# Patient Record
(demographics unavailable — no encounter records)

---

## 2024-11-06 NOTE — DATA REVIEWED
[de-identified] : Brain MRI was performed on 2/21/2024. There was no acute pathology. There was chronic ischemic change. [de-identified] : Blood test from February 2024 demonstrated B12 level of 226, and normal T4 level with slightly elevated TSH level.

## 2024-11-06 NOTE — CONSULT LETTER
[Dear  ___] : Dear  [unfilled], [Courtesy Letter:] : I had the pleasure of seeing your patient, [unfilled], in my office today. [Please see my note below.] : Please see my note below. [Consult Closing:] : Thank you very much for allowing me to participate in the care of this patient.  If you have any questions, please do not hesitate to contact me. [Sincerely,] : Sincerely, [FreeTextEntry3] : Demetris Andrews MD.

## 2024-11-06 NOTE — HISTORY OF PRESENT ILLNESS
[FreeTextEntry1] : I saw this patient in the office today. She presents with her son.  He reported that he has noticed that she has had cognitive decline for at least the past 6 to 8 months. She was hospitalized toward the middle of 2023 and it had been going on for a few months prior to that. Her overnight aides have noticed that occasionally at night she will get up and walk around the house.

## 2024-11-06 NOTE — ASSESSMENT
[FreeTextEntry1] : This is a 98-year-old woman with what appears to be moderate dementia likely representing Alzheimer's dementia. She had a slightly low B12 level. She is receiving supplementation for this. I do not see a need for any further diagnostic testing.  I explained that there are no medications that reverse cognitive decline in patients with dementia. There are medications that have been shown to slow the progression of functional decline. She is currently functioning at a relatively high level although she requires supervision. At present I do not see a need for any new medication. I have explained to her son should he notice any decline I would be happy to start medication. I would prefer memantine to donepezil as it tends to have fewer side effects.  I will see her back in 6 months.

## 2024-11-06 NOTE — PHYSICAL EXAM
[General Appearance - In No Acute Distress] : in no acute distress [General Appearance - Alert] : alert [Affect] : the affect was normal [Oriented to Person] : oriented to person [Oriented to Place] : oriented to place [Recall ___ / 3] : recall [unfilled] / 3 [Cranial Nerves Optic (II)] : visual acuity intact bilaterally,  visual fields full to confrontation, pupils equal round and reactive to light [Cranial Nerves Oculomotor (III)] : extraocular motion intact [Cranial Nerves Trigeminal (V)] : facial sensation intact symmetrically [Cranial Nerves Facial (VII)] : face symmetrical [Cranial Nerves Vestibulocochlear (VIII)] : hearing was intact bilaterally [Cranial Nerves Glossopharyngeal (IX)] : tongue and palate midline [Cranial Nerves Accessory (XI - Cranial And Spinal)] : head turning and shoulder shrug symmetric [Cranial Nerves Hypoglossal (XII)] : there was no tongue deviation with protrusion [Motor Tone] : muscle tone was normal in all four extremities [Motor Strength] : muscle strength was normal in all four extremities [Sensation Tactile Decrease] : light touch was intact [Sensation Pain / Temperature Decrease] : pain and temperature was intact [Sensation Vibration Decrease] : vibration was intact [Limited Balance] : the patient's balance was impaired [2+] : Patella left 2+ [Optic Disc Abnormality] : the optic disc were normal in size and color [Oriented to Time] : disoriented to time [Dysarthria] : no dysarthria [Aphasia] : no dysphasia/aphasia [Coordination - Dysmetria Impaired Finger-to-Nose Bilateral] : not present [Plantar Reflex Right Only] : normal on the right [Plantar Reflex Left Only] : normal on the left [FreeTextEntry8] : She presented in a wheelchair today.  She is able to ambulate with a walker at home.

## 2025-02-11 NOTE — ASSESSMENT
[FreeTextEntry1] : 98-year-old patient currently stable with progressive memory deficits recently having seen neurology with diagnosed with moderately advanced dementia feeling no medication is indicated.  Patient's son feels that she is status quo.  Hypertension controlled on present medications.  Mild asthma improved with Breo  Arrhythmia essentially controlled with occasional symptoms and patient instructed that if she has enough extra beats that persist she can take an extra half of Toprol if she needs to.  GERD controlled on chronic PPI Clinically euthyroid.  Previous blood work showed decreased vitamin B12 and vitamin D and recommended taking supplements which she is taking.  Status post fall May 2021 without any further falls. Patient's son feels she continues to be safe living alone with him watching over her closely.  Continue present medications High-dose influenza vaccine offered but declines Recieved the COVID vaccine Venipuncture done today in the office Followup in 6 months

## 2025-02-11 NOTE — HISTORY OF PRESENT ILLNESS
[FreeTextEntry1] : 98-year-old female presents with her son for followup with history of hypothyroidism for which she takes Synthroid, cardiac arrhythmia and hypertension on Toprol and chronic GERD on Nexium prn. Also history of von Willebrand's disease for which she periodically gets DDAVP injections  The patient has had increased issues with her memory and less functional physically.  Patient saw neurology May 2024 with diagnosis of moderately advanced dementia Alzheimer's type.  Medication was not felt indicated.  The patient is watched over by her son who states that he feels things are status quo.  Patient's ambulatory ability has decreased significantly where she generally is in a wheelchair.  Patient generally has been feeling very well without any complaints including chest pain, shortness of breath or edema.  The patient's continuing chronic issue is moderately severe bilateral knee degenerative joint disease for which she is following with orthopedic and gets injections periodically. She continues to follow with orthopedic with periodic injections  Also patient saw pulmonary in June 2020 with diagnoses of mild asthma and Breo has been started with benefit  Otherwise the patient is feeling well without chest pain, palpitations, shortness of breath or edema.  she is watched over closely by her son Cam who is her healthcare proxy. she no longer drives Unfortunately her main interaction was through her [employer] therefore decreased social interaction. She is content with being home and not going out and seeing other people. .She does have a medic alert

## 2025-02-11 NOTE — HISTORY OF PRESENT ILLNESS
[FreeTextEntry1] : 98-year-old female presents with her son for followup with history of hypothyroidism for which she takes Synthroid, cardiac arrhythmia and hypertension on Toprol and chronic GERD on Nexium prn. Also history of von Willebrand's disease for which she periodically gets DDAVP injections  The patient has had increased issues with her memory and less functional physically.  Patient saw neurology May 2024 with diagnosis of moderately advanced dementia Alzheimer's type.  Medication was not felt indicated.  The patient is watched over by her son who states that he feels things are status quo.  Patient's ambulatory ability has decreased significantly where she generally is in a wheelchair.  Patient generally has been feeling very well without any complaints including chest pain, shortness of breath or edema.  The patient's continuing chronic issue is moderately severe bilateral knee degenerative joint disease for which she is following with orthopedic and gets injections periodically. She continues to follow with orthopedic with periodic injections  Also patient saw pulmonary in June 2020 with diagnoses of mild asthma and Breo has been started with benefit  Otherwise the patient is feeling well without chest pain, palpitations, shortness of breath or edema.  she is watched over closely by her son Cam who is her healthcare proxy. she no longer drives Unfortunately her main interaction was through her [employer] therefore decreased social interaction. She is content with being home and not going out and seeing other people. .She does have a medic alert

## 2025-03-17 NOTE — HISTORY OF PRESENT ILLNESS
[FreeTextEntry2] : The patient presents with her son after being admitted to [facility] with patient with dementia presents to the ER on March 2, 2025 with fever for 24 hours as well as cough, congestion and short of breath. Also body aches and generalized malaise. Evaluated with positive influenza A. Treated with Tamiflu and Solu-Medrol as well as DuoNebs. CTA of the chest was done with PE ruled out with CAT scan showing bronchitis and questionable hydronephrosis. Follow-up renal ultrasound showed mild left hydronephrosis and/or parapelvic cysts. Chest PT done. She had an episode of A-fib treated with Lopressor. Echo showed EF at 65% with mild valvular issues. Risks and benefits of anticoagulation with A-fib discussed with the patient's son with patient having increased risks with known von Willebrand's disease. Decision was made to not give anticoagulation.  Improved and stable for discharge home on March 5, 2025.  She again presented on March 14, 2025 with recent admission from 3/2 to 3/5 for influenza A. She was brought in by her son secondary to him stating that since discharge she has been very lethargic, sleeping a lot, minimally eating although her appetite has worsened over the past 6 months. Underlying dementia. Also stated that she has been wheezing at night with some shortness of breath and as a result of not sleeping at night. Exam showed rhonchi. Labs good with clear chest x-ray. Pulse ox on room air 93-96%. Nebulizer and steroid given with improvement. Patient felt stable to discharge home with diagnosis of reactive airway disease without asthma.